# Patient Record
Sex: FEMALE | Race: WHITE | NOT HISPANIC OR LATINO | URBAN - METROPOLITAN AREA
[De-identification: names, ages, dates, MRNs, and addresses within clinical notes are randomized per-mention and may not be internally consistent; named-entity substitution may affect disease eponyms.]

---

## 2018-07-19 ENCOUNTER — OFFICE VISIT (OUTPATIENT)
Dept: URBAN - METROPOLITAN AREA CLINIC 33 | Facility: CLINIC | Age: 68
End: 2018-07-19
Payer: MEDICARE

## 2018-07-19 DIAGNOSIS — H02.32 BLEPHAROCHALASIS RIGHT LOWER EYELID: ICD-10-CM

## 2018-07-19 DIAGNOSIS — H02.35 BLEPHAROCHALASIS LEFT LOWER EYELID: Primary | ICD-10-CM

## 2018-07-19 DIAGNOSIS — Z96.1 PRESENCE OF INTRAOCULAR LENS: ICD-10-CM

## 2018-07-19 PROCEDURE — 99214 OFFICE O/P EST MOD 30 MIN: CPT | Performed by: OPTOMETRIST

## 2018-07-19 PROCEDURE — 92134 CPTRZ OPH DX IMG PST SGM RTA: CPT | Performed by: OPTOMETRIST

## 2018-07-19 RX ORDER — BACITRACIN 500 [USP'U]/G
OINTMENT OPHTHALMIC
Qty: 1 | Refills: 1 | Status: INACTIVE
Start: 2018-07-19 | End: 2022-04-01

## 2018-07-19 ASSESSMENT — VISUAL ACUITY
OD: 20/20
OS: 20/20

## 2018-07-19 ASSESSMENT — INTRAOCULAR PRESSURE
OD: 10
OS: 10

## 2018-07-19 NOTE — IMPRESSION/PLAN
Impression: Blepharochalasis right lower eyelid: H02.32. Plan: Compresses, RX for Bacitracin for 2 weeks before bedtime.

## 2018-07-19 NOTE — IMPRESSION/PLAN
Impression: Presence of intraocular lens: Z96.1. Plan: Patient educated about today's findings. Lens implant is clear and centered without need for treatment at this time. Patient to call office with any problems.

## 2018-07-19 NOTE — IMPRESSION/PLAN
Impression: Blepharochalasis left lower eyelid: H02.35. Plan: Compresses, RX for Bacitracin for 2 weeks before bedtime.

## 2019-08-16 ENCOUNTER — OFFICE VISIT (OUTPATIENT)
Dept: URBAN - METROPOLITAN AREA CLINIC 33 | Facility: CLINIC | Age: 69
End: 2019-08-16
Payer: MEDICARE

## 2019-08-16 DIAGNOSIS — H01.024: Primary | ICD-10-CM

## 2019-08-16 DIAGNOSIS — H43.813 VITREOUS DEGENERATION, BILATERAL: ICD-10-CM

## 2019-08-16 PROCEDURE — 99213 OFFICE O/P EST LOW 20 MIN: CPT | Performed by: OPTOMETRIST

## 2019-08-16 RX ORDER — NEOMYCIN SULFATE, POLYMYXIN B SULFATE AND DEXAMETHASONE 3.5; 10000; 1 MG/G; [USP'U]/G; MG/G
OINTMENT OPHTHALMIC
Qty: 1 | Refills: 2 | Status: INACTIVE
Start: 2019-08-16 | End: 2021-08-27

## 2019-08-16 RX ORDER — TOBRAMYCIN AND DEXAMETHASONE 3; 1 MG/G; MG/G
OINTMENT OPHTHALMIC
Qty: 1 | Refills: 2 | Status: INACTIVE
Start: 2019-08-16 | End: 2022-04-01

## 2019-08-16 ASSESSMENT — INTRAOCULAR PRESSURE
OD: 10
OS: 10

## 2019-08-16 NOTE — IMPRESSION/PLAN
Impression: Squamous blepharitis of lt upper eyelid: H01.024. Plan: Blepharitis accounts for the patient's complaints. The condition appears chronic and eyelid scrubs and warm compresses have been suggested. The patient understands this may not cure the condition and that there may be the need to be on a maintenance program. The patient was informed that the condition may take a few weeks to improve. 

Maxitrol Hal. QHS OU

## 2019-09-27 ENCOUNTER — OFFICE VISIT (OUTPATIENT)
Dept: URBAN - METROPOLITAN AREA CLINIC 33 | Facility: CLINIC | Age: 69
End: 2019-09-27
Payer: MEDICARE

## 2019-09-27 PROCEDURE — 99213 OFFICE O/P EST LOW 20 MIN: CPT | Performed by: OPTOMETRIST

## 2019-09-27 ASSESSMENT — INTRAOCULAR PRESSURE
OS: 9
OD: 10

## 2019-09-27 NOTE — IMPRESSION/PLAN
Impression: Squamous blepharitis of lt upper eyelid: H01.024. Plan: Resolving Blepharitis RUL/JADEN. Continue with eyelid scrubs and warm compresses. The patient understands this may not cure the condition and that there may be the need to be on a maintenance program.

Use Maxitrol Hal. QHS OU for flare ups Continue eyelid hygiene

## 2020-12-01 ENCOUNTER — OFFICE VISIT (OUTPATIENT)
Dept: URBAN - METROPOLITAN AREA CLINIC 33 | Facility: CLINIC | Age: 70
End: 2020-12-01
Payer: COMMERCIAL

## 2020-12-01 DIAGNOSIS — H35.3131 NONEXUDATIVE AGE-RELATED MACULAR DEGENERATION, BILATERAL, EARLY DRY STAGE: ICD-10-CM

## 2020-12-01 DIAGNOSIS — H01.024 SQUAMOUS BLEPHARITIS LEFT UPPER EYELID: ICD-10-CM

## 2020-12-01 DIAGNOSIS — Z96.1 PRESENCE OF INTRAOCULAR LENS: ICD-10-CM

## 2020-12-01 DIAGNOSIS — H01.021 SQUAMOUS BLEPHARITIS RIGHT UPPER EYELID: Primary | ICD-10-CM

## 2020-12-01 PROCEDURE — 92134 CPTRZ OPH DX IMG PST SGM RTA: CPT | Performed by: OPTOMETRIST

## 2020-12-01 PROCEDURE — 99203 OFFICE O/P NEW LOW 30 MIN: CPT | Performed by: OPTOMETRIST

## 2020-12-01 ASSESSMENT — INTRAOCULAR PRESSURE
OS: 12
OD: 12

## 2020-12-01 NOTE — IMPRESSION/PLAN
Impression: Nonexudative age-related macular degeneration, bilateral, early dry stage: H35.3131. Plan: Discussed diagnosis in detail with patient. Use of Amsler grid was explained. Will continue to monitor vision and the patient has been instructed to call with any vision changes. 

Ordered and reviewed MAC-OCT

## 2020-12-01 NOTE — IMPRESSION/PLAN
Impression: Squamous blepharitis right upper eyelid: H01.021. Plan: Blepharitis accounts for the patients complaints. Recommend patient starts warm compresses, eyelid scrubs, and artificial tears.  Advised patient to maintain eyelid hygiene to prevent irritation. 

-Sample of artificial tears given

## 2021-08-27 ENCOUNTER — OFFICE VISIT (OUTPATIENT)
Dept: URBAN - METROPOLITAN AREA CLINIC 33 | Facility: CLINIC | Age: 71
End: 2021-08-27
Payer: COMMERCIAL

## 2021-08-27 DIAGNOSIS — H01.021 SQUAMOUS BLEPHARITIS RIGHT UPPER EYELID: Primary | ICD-10-CM

## 2021-08-27 PROCEDURE — 99213 OFFICE O/P EST LOW 20 MIN: CPT | Performed by: OPTOMETRIST

## 2021-08-27 ASSESSMENT — KERATOMETRY
OS: 41.00
OD: 40.50

## 2021-08-27 ASSESSMENT — INTRAOCULAR PRESSURE
OS: 10
OD: 8

## 2021-08-27 NOTE — IMPRESSION/PLAN
Impression: Bilateral nonexudative age-related macular degeneration, early dry stage: H35.3131. Plan: Discussed diagnosis in detail with patient. Will continue to monitor vision and the patient has been instructed to call with any vision changes.

## 2021-08-27 NOTE — IMPRESSION/PLAN
Impression: Presence of intraocular lens: Z96.1. Plan: Patient educated about today's findings. Lens implant is clear and centered without need for treatment at this time. No need for glasses at this time. Patient can use OTC readers as needed.

## 2021-08-27 NOTE — IMPRESSION/PLAN
Impression: Squamous blepharitis right upper eyelid: H01.021. Plan: Discussed condition with patient. Patient has been eyelid scrubs and artificial tears to treat condition. Advised patient to try Refresh gel drops in addition to current treatment.

## 2022-04-01 ENCOUNTER — OFFICE VISIT (OUTPATIENT)
Dept: URBAN - METROPOLITAN AREA CLINIC 33 | Facility: CLINIC | Age: 72
End: 2022-04-01
Payer: MEDICARE

## 2022-04-01 DIAGNOSIS — H35.3131 BILATERAL NONEXUDATIVE AGE-RELATED MACULAR DEGENERATION, EARLY DRY STAGE: ICD-10-CM

## 2022-04-01 DIAGNOSIS — H16.223 KERATOCONJUNCTIVITIS SICCA, NOT SPECIFIED AS SJÖGREN'S, BILATERAL: Primary | ICD-10-CM

## 2022-04-01 PROCEDURE — 99213 OFFICE O/P EST LOW 20 MIN: CPT | Performed by: OPTOMETRIST

## 2022-04-01 RX ORDER — CYCLOSPORINE 0.5 MG/ML
0.05 % EMULSION OPHTHALMIC
Qty: 90 | Refills: 4 | Status: ACTIVE
Start: 2022-04-01

## 2022-04-01 ASSESSMENT — INTRAOCULAR PRESSURE
OS: 10
OD: 11

## 2022-04-01 NOTE — IMPRESSION/PLAN
Impression: Bilateral nonexudative age-related macular degeneration, early dry stage: H35.3131. Plan: Discussed diagnosis in detail with patient. No treatment necessary at this time. Will monitor condition yearly.

## 2023-03-23 ENCOUNTER — OFFICE VISIT (OUTPATIENT)
Dept: URBAN - METROPOLITAN AREA CLINIC 33 | Facility: CLINIC | Age: 73
End: 2023-03-23
Payer: COMMERCIAL

## 2023-03-23 DIAGNOSIS — H16.223 KERATOCONJUNCTIVITIS SICCA, NOT SPECIFIED AS SJÖGREN'S, BILATERAL: Primary | ICD-10-CM

## 2023-03-23 DIAGNOSIS — Z96.1 PRESENCE OF INTRAOCULAR LENS: ICD-10-CM

## 2023-03-23 DIAGNOSIS — H52.4 PRESBYOPIA: ICD-10-CM

## 2023-03-23 PROCEDURE — 99213 OFFICE O/P EST LOW 20 MIN: CPT | Performed by: OPTOMETRIST

## 2023-03-23 RX ORDER — CYCLOSPORINE 0.5 MG/ML
0.05 % EMULSION OPHTHALMIC
Qty: 90 | Refills: 4 | Status: ACTIVE
Start: 2023-03-23

## 2023-03-23 ASSESSMENT — INTRAOCULAR PRESSURE
OD: 11
OS: 10

## 2024-03-29 ENCOUNTER — OFFICE VISIT (OUTPATIENT)
Dept: URBAN - METROPOLITAN AREA CLINIC 33 | Facility: CLINIC | Age: 74
End: 2024-03-29
Payer: COMMERCIAL

## 2024-03-29 DIAGNOSIS — H16.223 KERATOCONJUNCTIVITIS SICCA, NOT SPECIFIED AS SJ”GREN'S, BILATERAL: ICD-10-CM

## 2024-03-29 DIAGNOSIS — H35.3131 BILATERAL NONEXUDATIVE AGE-RELATED MACULAR DEGENERATION, EARLY DRY STAGE: Primary | ICD-10-CM

## 2024-03-29 DIAGNOSIS — Z96.1 PRESENCE OF INTRAOCULAR LENS: ICD-10-CM

## 2024-03-29 PROCEDURE — 99213 OFFICE O/P EST LOW 20 MIN: CPT

## 2024-03-29 RX ORDER — CYCLOSPORINE 0.5 MG/ML
0.05 % EMULSION OPHTHALMIC
Qty: 90 | Refills: 4 | Status: ACTIVE
Start: 2024-03-29

## 2024-03-29 RX ORDER — CYCLOSPORINE 0.5 MG/ML
0.05 % EMULSION OPHTHALMIC
Qty: 90 | Refills: 4 | Status: INACTIVE
Start: 2024-03-29 | End: 2024-03-29

## 2024-03-29 ASSESSMENT — INTRAOCULAR PRESSURE
OS: 14
OD: 13

## 2024-03-29 ASSESSMENT — VISUAL ACUITY
OD: 20/30
OS: 20/20

## 2025-02-04 ENCOUNTER — OFFICE VISIT (OUTPATIENT)
Dept: URBAN - METROPOLITAN AREA CLINIC 33 | Facility: CLINIC | Age: 75
End: 2025-02-04
Payer: COMMERCIAL

## 2025-02-04 DIAGNOSIS — H16.223 KERATOCONJUNCTIVITIS SICCA, NOT SPECIFIED AS SJ”GREN'S, BILATERAL: ICD-10-CM

## 2025-02-04 DIAGNOSIS — H35.3131 BILATERAL NONEXUDATIVE AGE-RELATED MACULAR DEGENERATION, EARLY DRY STAGE: Primary | ICD-10-CM

## 2025-02-04 DIAGNOSIS — H43.813 VITREOUS DEGENERATION, BILATERAL: ICD-10-CM

## 2025-02-04 DIAGNOSIS — Z96.1 PRESENCE OF INTRAOCULAR LENS: ICD-10-CM

## 2025-02-04 PROCEDURE — 92014 COMPRE OPH EXAM EST PT 1/>: CPT

## 2025-02-04 PROCEDURE — 92134 CPTRZ OPH DX IMG PST SGM RTA: CPT

## 2025-02-04 RX ORDER — POLYETHYLENE GLYCOL 400 AND PROPYLENE GLYCOL 4; 3 MG/ML; MG/ML
SOLUTION/ DROPS OPHTHALMIC
Qty: 10 | Refills: 6 | Status: ACTIVE
Start: 2025-02-04

## 2025-02-04 RX ORDER — CYCLOSPORINE 0.5 MG/ML
0.05 % EMULSION OPHTHALMIC
Qty: 180 | Refills: 3 | Status: ACTIVE
Start: 2025-02-04

## 2025-02-04 ASSESSMENT — INTRAOCULAR PRESSURE
OS: 13
OD: 12